# Patient Record
Sex: MALE | Race: WHITE | Employment: FULL TIME | URBAN - METROPOLITAN AREA
[De-identification: names, ages, dates, MRNs, and addresses within clinical notes are randomized per-mention and may not be internally consistent; named-entity substitution may affect disease eponyms.]

---

## 2017-02-10 ENCOUNTER — OFFICE VISIT (OUTPATIENT)
Dept: INTERNAL MEDICINE CLINIC | Facility: CLINIC | Age: 37
End: 2017-02-10

## 2017-02-10 VITALS
WEIGHT: 219 LBS | OXYGEN SATURATION: 98 % | SYSTOLIC BLOOD PRESSURE: 116 MMHG | HEART RATE: 58 BPM | TEMPERATURE: 98 F | DIASTOLIC BLOOD PRESSURE: 78 MMHG | HEIGHT: 70.5 IN | BODY MASS INDEX: 31 KG/M2

## 2017-02-10 DIAGNOSIS — S93.529A TURF TOE, INITIAL ENCOUNTER: Primary | ICD-10-CM

## 2017-02-10 DIAGNOSIS — Z00.00 ENCOUNTER FOR WELLNESS EXAMINATION: ICD-10-CM

## 2017-02-10 DIAGNOSIS — J34.89 NASAL OBSTRUCTION: ICD-10-CM

## 2017-02-10 DIAGNOSIS — B35.6 JOCK ITCH: ICD-10-CM

## 2017-02-10 DIAGNOSIS — R21 RASH OF NECK: ICD-10-CM

## 2017-02-10 DIAGNOSIS — K58.0 IRRITABLE BOWEL SYNDROME WITH DIARRHEA: ICD-10-CM

## 2017-02-10 DIAGNOSIS — G43.109 MIGRAINE WITH AURA AND WITHOUT STATUS MIGRAINOSUS, NOT INTRACTABLE: ICD-10-CM

## 2017-02-10 PROBLEM — K58.9 IRRITABLE BOWEL SYNDROME: Status: ACTIVE | Noted: 2017-02-10

## 2017-02-10 PROCEDURE — 99395 PREV VISIT EST AGE 18-39: CPT | Performed by: INTERNAL MEDICINE

## 2017-02-10 PROCEDURE — 99203 OFFICE O/P NEW LOW 30 MIN: CPT | Performed by: INTERNAL MEDICINE

## 2017-02-10 RX ORDER — SUMATRIPTAN 25 MG/1
25 TABLET, FILM COATED ORAL EVERY 2 HOUR PRN
Qty: 9 TABLET | Refills: 1 | Status: SHIPPED | OUTPATIENT
Start: 2017-02-10

## 2017-02-10 NOTE — PROGRESS NOTES
Royal Valentino is a 31year old male. HPI:   Mr Suzie Gagnon is here for annual wellness exam. He has several issues as listed below but generally he is doing well, works daily as a , good energy, good appetite.      Past Hx: skull fracture at heartburn  NEURO: denies headaches    EXAM:   /78 mmHg  Pulse 58  Temp(Src) 98 °F (36.7 °C) (Oral)  Ht 5' 10.5\" (1.791 m)  Wt 219 lb (99.338 kg)  BMI 30.97 kg/m2  SpO2 98%  GENERAL: well developed, well nourished,in no apparent distress  SKIN: no ra

## 2018-02-26 PROBLEM — J34.2 NASAL SEPTAL DEVIATION: Status: ACTIVE | Noted: 2018-02-26

## 2018-02-26 PROBLEM — J34.3 HYPERTROPHY OF BOTH INFERIOR NASAL TURBINATES: Status: ACTIVE | Noted: 2018-02-26

## 2023-02-16 ENCOUNTER — OFFICE VISIT (OUTPATIENT)
Dept: PULMONOLOGY | Facility: CLINIC | Age: 43
End: 2023-02-16
Payer: COMMERCIAL

## 2023-02-16 VITALS
RESPIRATION RATE: 20 BRPM | OXYGEN SATURATION: 99 % | BODY MASS INDEX: 34.45 KG/M2 | SYSTOLIC BLOOD PRESSURE: 126 MMHG | DIASTOLIC BLOOD PRESSURE: 75 MMHG | HEART RATE: 67 BPM | HEIGHT: 71 IN | WEIGHT: 246.06 LBS

## 2023-02-16 DIAGNOSIS — J30.0 VASOMOTOR RHINITIS: ICD-10-CM

## 2023-02-16 DIAGNOSIS — R05.3 CHRONIC COUGH: Primary | ICD-10-CM

## 2023-02-16 PROCEDURE — 99999 PR PBB SHADOW E&M-NEW PATIENT-LVL III: ICD-10-PCS | Mod: PBBFAC,,, | Performed by: INTERNAL MEDICINE

## 2023-02-16 PROCEDURE — 3008F BODY MASS INDEX DOCD: CPT | Mod: CPTII,S$GLB,, | Performed by: INTERNAL MEDICINE

## 2023-02-16 PROCEDURE — 3078F DIAST BP <80 MM HG: CPT | Mod: CPTII,S$GLB,, | Performed by: INTERNAL MEDICINE

## 2023-02-16 PROCEDURE — 3008F PR BODY MASS INDEX (BMI) DOCUMENTED: ICD-10-PCS | Mod: CPTII,S$GLB,, | Performed by: INTERNAL MEDICINE

## 2023-02-16 PROCEDURE — 99999 PR PBB SHADOW E&M-NEW PATIENT-LVL III: CPT | Mod: PBBFAC,,, | Performed by: INTERNAL MEDICINE

## 2023-02-16 PROCEDURE — 3078F PR MOST RECENT DIASTOLIC BLOOD PRESSURE < 80 MM HG: ICD-10-PCS | Mod: CPTII,S$GLB,, | Performed by: INTERNAL MEDICINE

## 2023-02-16 PROCEDURE — 99204 PR OFFICE/OUTPT VISIT, NEW, LEVL IV, 45-59 MIN: ICD-10-PCS | Mod: S$GLB,,, | Performed by: INTERNAL MEDICINE

## 2023-02-16 PROCEDURE — 3074F SYST BP LT 130 MM HG: CPT | Mod: CPTII,S$GLB,, | Performed by: INTERNAL MEDICINE

## 2023-02-16 PROCEDURE — 3074F PR MOST RECENT SYSTOLIC BLOOD PRESSURE < 130 MM HG: ICD-10-PCS | Mod: CPTII,S$GLB,, | Performed by: INTERNAL MEDICINE

## 2023-02-16 PROCEDURE — 99204 OFFICE O/P NEW MOD 45 MIN: CPT | Mod: S$GLB,,, | Performed by: INTERNAL MEDICINE

## 2023-02-16 PROCEDURE — 1160F PR REVIEW ALL MEDS BY PRESCRIBER/CLIN PHARMACIST DOCUMENTED: ICD-10-PCS | Mod: CPTII,S$GLB,, | Performed by: INTERNAL MEDICINE

## 2023-02-16 PROCEDURE — 1160F RVW MEDS BY RX/DR IN RCRD: CPT | Mod: CPTII,S$GLB,, | Performed by: INTERNAL MEDICINE

## 2023-02-16 PROCEDURE — 1159F PR MEDICATION LIST DOCUMENTED IN MEDICAL RECORD: ICD-10-PCS | Mod: CPTII,S$GLB,, | Performed by: INTERNAL MEDICINE

## 2023-02-16 PROCEDURE — 1159F MED LIST DOCD IN RCRD: CPT | Mod: CPTII,S$GLB,, | Performed by: INTERNAL MEDICINE

## 2023-02-16 RX ORDER — GABAPENTIN 300 MG/1
600 CAPSULE ORAL 3 TIMES DAILY
Qty: 180 CAPSULE | Refills: 11 | Status: SHIPPED | OUTPATIENT
Start: 2023-02-16

## 2023-02-16 RX ORDER — IPRATROPIUM BROMIDE 21 UG/1
2 SPRAY, METERED NASAL 2 TIMES DAILY
Qty: 30 ML | Refills: 6 | Status: SHIPPED | OUTPATIENT
Start: 2023-02-16

## 2023-02-16 NOTE — PROGRESS NOTES
Subjective:       Patient ID: Dandy Connell is a 42 y.o. male.    Chief Complaint: Cough    Cough      Patient is a 42-year-old male who was otherwise healthy who recently moved here from [place] in September of 2022.  Prior to that he was evaluated by multiple physicians and multiple specialties for a persistent/chronic cough that began the early part of last year with no particular cause found.  He had multiple studies including spirometry (normal), methacholine challenge (negative), upper endoscopy, pH probe, CT of the chest, nasopharyngoscopy, sinus imaging and visualization he even underwent a uvulectomy none of which has helped decidedly with his chronic dry cough.  He states that he has frequent paroxysms that can sometimes even cause near syncope.  He was ultimately diagnosed with globus pharyngeal us because of frequent sensation of fullness to the right neck associated with his cough in an unconscious need to turn his head to the right when he does cough.  I reviewed the above studies as well as many additional studies that the patient provided from his chart account on his mobile phone.  He was treated unsuccessfully with tricyclic antidepressant.  He was treated unsuccessfully empirically for reflux as well.  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  No significant industrial exposure.  Inciting factors which makes the cough worse include exercise and talking for extended periods of time.  He has a self-referral today for the above reasons    Past Medical History:   Diagnosis Date    Arthritis      History reviewed. No pertinent surgical history.  Social History     Tobacco Use    Smoking status: Never    Smokeless tobacco: Never   Substance Use Topics    Alcohol use: Yes     Alcohol/week: 3.0 standard drinks     Types: 3 Glasses of wine per week    Drug use: Not Currently     History reviewed. No pertinent family history.    Review of Systems   Respiratory:  Positive for cough.     as per HPI otherwise negative    Objective:      Physical Exam   Constitutional: He is oriented to person, place, and time. He appears well-developed and well-nourished. No distress.   HENT:   Head: Normocephalic.   Mouth/Throat: Mallampati Score: I.   Bilateral tympanic membranes and internal auditory canal are normal   Neck: No JVD present.   Cardiovascular: Normal rate and regular rhythm.   No murmur heard.  Pulmonary/Chest: Normal expansion, symmetric chest wall expansion, effort normal and breath sounds normal.   Abdominal: Soft. There is no hepatosplenomegaly.   Musculoskeletal:         General: No edema.      Cervical back: Neck supple.   Neurological: He is alert and oriented to person, place, and time.   Psychiatric: He has a normal mood and affect.   Nursing note and vitals reviewed.        Assessment:       1. Chronic cough    2. Vasomotor rhinitis          Outpatient Encounter Medications as of 2/16/2023   Medication Sig Dispense Refill    gabapentin (NEURONTIN) 300 MG capsule Take 2 capsules (600 mg total) by mouth 3 (three) times daily. 180 capsule 11    ipratropium (ATROVENT) 21 mcg (0.03 %) nasal spray 2 sprays by Each Nostril route 2 (two) times daily. 30 mL 6     No facility-administered encounter medications on file as of 2/16/2023.       Plan:       Chronic cough    Thus far Mr. Connell seems to have quite an atypical presentation of chronic cough.  The usual suspects have been successfully eliminated and/or have failed treatment.  There is at least some data 4 Neurontin in his suspected neural pathway trigger mediated cough.  Additionally the use of Atrovent nose spray may provide some benefit as well.  I will start him on Atrovent nose spray twice a day and Neurontin 300 mg 3 times a day with a plan to taper that up to 600 mg t.i.d. in 2 weeks if tolerated.  Additionally Dr. Srinivasan at [facility] may be able to provide some additional expertise and I will place a  referral to him for that as well.  I discussed the above with the patient in detail and he voiced understanding and agreement with this plan.  I will see him back in 4 weeks for close follow-up.

## 2023-03-30 ENCOUNTER — TELEPHONE (OUTPATIENT)
Dept: DERMATOLOGY | Facility: CLINIC | Age: 43
End: 2023-03-30
Payer: COMMERCIAL

## 2023-03-30 NOTE — TELEPHONE ENCOUNTER
Spoke to patient regarding his appt. Instructed him to contact his insurance [employer] [place] for find a provider. ----- Message from Paul Calhoun sent at 3/30/2023 10:01 AM CDT -----  Contact: Pt  Type:  Patient Returning Call    Who Called:pt   Who Left Message for Patient:Josephine   Does the patient know what this is regarding?: an appt   Would the patient rather a call back or a response via [facility]? phone  Best Call Back Number: [phone]   Additional Information: pt is at work and would like the nurse call him anytime after [phone]

## 2023-03-30 NOTE — TELEPHONE ENCOUNTER
Attempted to call pt in regards to scheduling an appointment. No answer. Message left.   ----- Message from Jazmin Astorga sent at 3/29/2023  7:32 PM CDT -----  Regarding: Scheduling  Good Evening    Can I get the above referenced patient scheduled. He has a mole on his back that appears to be getting darker and also has a rash on his knee and both elbows    Thank You

## 2023-08-07 ENCOUNTER — OFFICE VISIT (OUTPATIENT)
Dept: PRIMARY CARE CLINIC | Facility: CLINIC | Age: 43
End: 2023-08-07
Payer: COMMERCIAL

## 2023-08-07 DIAGNOSIS — B35.9 TINEA: Primary | ICD-10-CM

## 2023-08-07 PROCEDURE — 1160F PR REVIEW ALL MEDS BY PRESCRIBER/CLIN PHARMACIST DOCUMENTED: ICD-10-PCS | Mod: CPTII,95,, | Performed by: INTERNAL MEDICINE

## 2023-08-07 PROCEDURE — 99203 PR OFFICE/OUTPT VISIT, NEW, LEVL III, 30-44 MIN: ICD-10-PCS | Mod: 95,,, | Performed by: INTERNAL MEDICINE

## 2023-08-07 PROCEDURE — 1159F PR MEDICATION LIST DOCUMENTED IN MEDICAL RECORD: ICD-10-PCS | Mod: CPTII,95,, | Performed by: INTERNAL MEDICINE

## 2023-08-07 PROCEDURE — 1160F RVW MEDS BY RX/DR IN RCRD: CPT | Mod: CPTII,95,, | Performed by: INTERNAL MEDICINE

## 2023-08-07 PROCEDURE — 1159F MED LIST DOCD IN RCRD: CPT | Mod: CPTII,95,, | Performed by: INTERNAL MEDICINE

## 2023-08-07 PROCEDURE — 99203 OFFICE O/P NEW LOW 30 MIN: CPT | Mod: 95,,, | Performed by: INTERNAL MEDICINE

## 2023-08-07 RX ORDER — FLUCONAZOLE 200 MG/1
200 TABLET ORAL WEEKLY
Qty: 4 TABLET | Refills: 0 | Status: SHIPPED | OUTPATIENT
Start: 2023-08-07 | End: 2023-08-29

## 2023-08-07 RX ORDER — CLOTRIMAZOLE AND BETAMETHASONE DIPROPIONATE 10; .64 MG/G; MG/G
CREAM TOPICAL 2 TIMES DAILY
Qty: 80 G | Refills: 5 | Status: SHIPPED | OUTPATIENT
Start: 2023-08-07

## 2023-08-07 RX ORDER — HYDROXYZINE HYDROCHLORIDE 25 MG/1
25 TABLET, FILM COATED ORAL 3 TIMES DAILY PRN
Qty: 30 TABLET | Refills: 0 | Status: SHIPPED | OUTPATIENT
Start: 2023-08-07

## 2023-08-07 NOTE — PROGRESS NOTES
The patient location is: [place]  The chief complaint leading to consultation is: rash    Visit type: audiovisual    Face to Face time with patient:   20 minutes of total time spent on the encounter, which includes face to face time and non-face to face time preparing to see the patient (eg, review of tests), Obtaining and/or reviewing separately obtained history, Documenting clinical information in the electronic or other health record, Independently interpreting results (not separately reported) and communicating results to the patient/family/caregiver, or Care coordination (not separately reported).         Each patient to whom he or she provides medical services by telemedicine is:  (1) informed of the relationship between the physician and patient and the respective role of any other health care provider with respect to management of the patient; and (2) notified that he or she may decline to receive medical services by telemedicine and may withdraw from such care at any time.    Notes:     Subjective     Patient ID: Dandy Connell is a 43 y.o. male.    Chief Complaint: rash    Rash  This is a new problem. The current episode started 1 to 4 weeks ago. The problem has been rapidly worsening since onset. The affected locations include the neck, chest, groin, left buttock, right axilla, right arm, right hand and right buttock. The rash is characterized by dryness, pain, redness, itchiness and peeling. He was exposed to nothing. Pertinent negatives include no anorexia, congestion, cough, diarrhea, eye pain, facial edema, fatigue, fever, joint pain, nail changes, rhinorrhea, shortness of breath, sore throat or vomiting. Past treatments include analgesics, anti-itch cream, antibiotics and topical steroids. The treatment provided no relief. His past medical history is significant for eczema and varicella. There is no history of allergies or asthma.       Past Medical History:   Diagnosis Date    Arthritis      Review of  patient's allergies indicates:   Allergen Reactions    Mold      Unknown reaction per patient     History reviewed. No pertinent surgical history.  History reviewed. No pertinent family history.  Social History     Socioeconomic History    Marital status:    Tobacco Use    Smoking status: Never    Smokeless tobacco: Never   Substance and Sexual Activity    Alcohol use: Yes     Alcohol/week: 3.0 standard drinks of alcohol     Types: 3 Glasses of wine per week    Drug use: Not Currently    Sexual activity: Yes         There were no vitals taken for this visit.  Outpatient Medications as of 8/7/2023   Medication Sig Dispense Refill    gabapentin (NEURONTIN) 300 MG capsule Take 2 capsules (600 mg total) by mouth 3 (three) times daily. 180 capsule 11    ipratropium (ATROVENT) 21 mcg (0.03 %) nasal spray 2 sprays by Each Nostril route 2 (two) times daily. 30 mL 6     No current facility-administered medications on file as of 8/7/2023.       Review of Systems   Constitutional:  Negative for fatigue and fever.   HENT:  Negative for nasal congestion, rhinorrhea and sore throat.    Eyes:  Negative for pain.   Respiratory:  Negative for cough and shortness of breath.    Gastrointestinal:  Negative for anorexia, diarrhea and vomiting.   Musculoskeletal:  Negative for joint pain.   Integumentary:  Positive for rash. Negative for nail changes.   All other systems reviewed and are negative.         Objective     Physical Exam  A&O  Demarcated hyperpigmented rash right axilla and upper chest; reports it is in groin area as well     Assessment and Plan     1. Tinea  -     clotrimazole-betamethasone 1-0.05% (LOTRISONE) cream; Apply topically 2 (two) times daily.  Dispense: 80 g; Refill: 5  -     hydrOXYzine HCL (ATARAX) 25 MG tablet; Take 1 tablet (25 mg total) by mouth 3 (three) times daily as needed for Itching.  Dispense: 30 tablet; Refill: 0  -     fluconazole (DIFLUCAN) 200 MG Tab; Take 1 tablet (200 mg total) by mouth  once a week. for 4 doses  Dispense: 4 tablet; Refill: 0         F/u in person this week    Dove sensitive soap  All or tide sensitive

## 2024-07-31 ENCOUNTER — OFFICE VISIT (OUTPATIENT)
Dept: PAIN MEDICINE | Facility: CLINIC | Age: 44
End: 2024-07-31
Payer: COMMERCIAL

## 2024-07-31 ENCOUNTER — HOSPITAL ENCOUNTER (OUTPATIENT)
Dept: RADIOLOGY | Facility: HOSPITAL | Age: 44
Discharge: HOME | End: 2024-07-31
Attending: PHYSICAL MEDICINE & REHABILITATION
Payer: COMMERCIAL

## 2024-07-31 VITALS
DIASTOLIC BLOOD PRESSURE: 73 MMHG | HEART RATE: 69 BPM | SYSTOLIC BLOOD PRESSURE: 113 MMHG | HEIGHT: 71 IN | BODY MASS INDEX: 30.1 KG/M2 | WEIGHT: 215 LBS

## 2024-07-31 DIAGNOSIS — M54.16 ACUTE LUMBAR RADICULOPATHY: ICD-10-CM

## 2024-07-31 DIAGNOSIS — M54.16 ACUTE LUMBAR RADICULOPATHY: Primary | ICD-10-CM

## 2024-07-31 PROCEDURE — 3078F DIAST BP <80 MM HG: CPT | Mod: CPTII,S$GLB,, | Performed by: PHYSICAL MEDICINE & REHABILITATION

## 2024-07-31 PROCEDURE — 72114 X-RAY EXAM L-S SPINE BENDING: CPT | Mod: 26,,, | Performed by: RADIOLOGY

## 2024-07-31 PROCEDURE — 3008F BODY MASS INDEX DOCD: CPT | Mod: CPTII,S$GLB,, | Performed by: PHYSICAL MEDICINE & REHABILITATION

## 2024-07-31 PROCEDURE — 72114 X-RAY EXAM L-S SPINE BENDING: CPT | Mod: TC

## 2024-07-31 PROCEDURE — 99203 OFFICE O/P NEW LOW 30 MIN: CPT | Mod: S$GLB,,, | Performed by: PHYSICAL MEDICINE & REHABILITATION

## 2024-07-31 PROCEDURE — 99999 PR PBB SHADOW E&M-EST. PATIENT-LVL III: CPT | Mod: PBBFAC,,, | Performed by: PHYSICAL MEDICINE & REHABILITATION

## 2024-07-31 PROCEDURE — 3074F SYST BP LT 130 MM HG: CPT | Mod: CPTII,S$GLB,, | Performed by: PHYSICAL MEDICINE & REHABILITATION

## 2024-07-31 PROCEDURE — 1159F MED LIST DOCD IN RCRD: CPT | Mod: CPTII,S$GLB,, | Performed by: PHYSICAL MEDICINE & REHABILITATION

## 2024-07-31 RX ORDER — METHYLPREDNISOLONE 4 MG/1
TABLET ORAL
Qty: 1 EACH | Refills: 0 | Status: SHIPPED | OUTPATIENT
Start: 2024-07-31

## 2024-07-31 RX ORDER — DICLOFENAC SODIUM 75 MG/1
75 TABLET, DELAYED RELEASE ORAL 2 TIMES DAILY
Qty: 28 TABLET | Refills: 0 | Status: SHIPPED | OUTPATIENT
Start: 2024-07-31 | End: 2024-08-14

## 2024-07-31 NOTE — PROGRESS NOTES
New Patient Chronic Pain Note (Initial Visit)    Referring Physician: Karis Cook    PCP: Karen Roman MD    Chief Complaint:   Chief Complaint   Patient presents with    Low-back Pain     Shooting into left leg and up spine        SUBJECTIVE:    Dandy Connell is a 44 y.o. male who presents to the clinic for the evaluation of lower back and leg pain.  He is self referred.  The pain started about 5 days ago following a long road trip and repetitive heavy lifting and symptoms have been unchanged.  He reports he has not had pain like this before.  The pain is located in the lumbosacral area and radiates to the bilateral lower extremities, left worse than right.  The pain is described as  sharp, stabbing, aching  and is rated as 9/10. The pain is rated with a score of  9/10 on the BEST day and a score of 10/10 on the WORST day.  Symptoms interfere with daily activity. The pain is exacerbated by walking, sit to stand, prolonged sitting, squatting.  The pain is mitigated by heat.     Patient denies night fever/night sweats, urinary incontinence, bowel incontinence, significant weight loss, significant motor weakness, and loss of sensations.    Pain Disability Index Review:         7/31/2024     9:42 AM   Last 3 PDI Scores   Pain Disability Index (PDI) 70       Non-Pharmacologic Treatments:  Physical Therapy/Home Exercise: no  Ice/Heat:yes  TENS: no  Acupuncture: no  Massage: no  Chiropractic: no    Other: no      Pain Medications:  - Opioids:  None  - Adjuvant Medications:  Gabapentin  - Anti-Coagulants:  None     report:  Reviewed and consistent with medication use as prescribed.    Pain Procedures:   Denies      Imaging:   No pertinent imaging available to review    Past Medical History:   Diagnosis Date    Arthritis      History reviewed. No pertinent surgical history.  Social History     Socioeconomic History    Marital status:    Tobacco Use    Smoking status: Never    Smokeless tobacco:  Never   Substance and Sexual Activity    Alcohol use: Yes     Alcohol/week: 3.0 standard drinks of alcohol     Types: 3 Glasses of wine per week    Drug use: Not Currently    Sexual activity: Yes     No family history on file.    Review of patient's allergies indicates:   Allergen Reactions    Mold      Unknown reaction per patient       Current Outpatient Medications   Medication Sig    clotrimazole-betamethasone 1-0.05% (LOTRISONE) cream Apply topically 2 (two) times daily.    hydrOXYzine HCL (ATARAX) 25 MG tablet Take 1 tablet (25 mg total) by mouth 3 (three) times daily as needed for Itching.    diclofenac (VOLTAREN) 75 MG EC tablet Take 1 tablet (75 mg total) by mouth 2 (two) times daily. for 14 days    gabapentin (NEURONTIN) 300 MG capsule Take 2 capsules (600 mg total) by mouth 3 (three) times daily.    ipratropium (ATROVENT) 21 mcg (0.03 %) nasal spray 2 sprays by Each Nostril route 2 (two) times daily.    methylPREDNISolone (MEDROL DOSEPACK) 4 mg tablet use as directed     No current facility-administered medications for this visit.       Review of Systems     GENERAL:  No weight loss, malaise or fevers.  HEENT:   No recent changes in vision or hearing  NECK:  Negative for lumps, no difficulty with swallowing.  RESPIRATORY:  Negative for cough, wheezing or shortness of breath, patient denies any recent URI.  CARDIOVASCULAR:  Negative for chest pain, leg swelling or palpitations.  GI:  Negative for abdominal discomfort, blood in stools or black stools or change in bowel habits.  MUSCULOSKELETAL:  See HPI.  SKIN:  Negative for lesions, rash, and itching.  PSYCH:  No mood disorder or recent psychosocial stressors.  Patients sleep is not disturbed secondary to pain.  HEMATOLOGY/LYMPHOLOGY:  Negative for prolonged bleeding, bruising easily or swollen nodes.  Patient is not currently taking any anti-coagulants  NEURO:   No history of headaches, syncope, paralysis, seizures or tremors.  All other reviewed and  "negative other than HPI.    OBJECTIVE:    /73   Pulse 69   Ht 5' 11" (1.803 m)   Wt 97.5 kg (215 lb)   BMI 29.99 kg/m²         Physical Exam    GENERAL: Well appearing, in no acute distress, alert and oriented x3.  PSYCH:  Mood and affect appropriate.  SKIN: Skin color, texture, turgor normal, no rashes or lesions.  HEAD/FACE:  Normocephalic, atraumatic. Cranial nerves grossly intact.  CV: RRR with palpation of the radial artery.  PULM: No evidence of respiratory difficulty, symmetric chest rise.  GI:  Soft and non-tender.  BACK: Straight leg raising in the sitting and supine positions is positive to radicular pain bilaterally, left worse than right.  Mild pain to palpation over the facet joints of the lumbar spine and lumbar paraspinals.  Limited range of motion secondary to pain reproduction.   EXTREMITIES:  No deformities, edema, or skin discoloration. Good capillary refill.  MUSCULOSKELETAL:  Significant pain with hip flexion bilaterally.  There is no pain with palpation over the sacroiliac joints bilaterally.  FABERs test is negative.  FADIRs test is negative.   Bilateral upper and lower extremity strength is normal and symmetric.  No atrophy or tone abnormalities are noted.  NEURO: Bilateral upper and lower extremity coordination and muscle stretch reflexes are physiologic and symmetric.  Plantar response are downgoing. No clonus.  No loss of sensation is noted.  GAIT:  Slow, antalgic, difficulty with sit-to-stand.      LABS:  No results found for: "WBC", "HGB", "HCT", "MCV", "PLT"    CMP  No results found for: "NA", "K", "CL", "CO2", "GLU", "BUN", "CREATININE", "CALCIUM", "PROT", "ALBUMIN", "BILITOT", "ALKPHOS", "AST", "ALT", "ANIONGAP", "ESTGFRAFRICA", "EGFRNONAA"    No results found for: "LABA1C", "HGBA1C"          ASSESSMENT: 44 y.o. year old male with lower back pain, consistent with     1. Acute lumbar radiculopathy  X-Ray Lumbar Complete Including Flex And Ext            PLAN:   - " Interventions:  None at this time    - Anticoagulation use:  None      - Medications: I have stressed the importance of physical activity and a home exercise plan to help with pain and improve health. and Patient can continue with medications for now since they are providing benefits, using them appropriately, and without side effects.     Provide Medrol Dosepak for acute inflammatory back pain  Patient may utilize diclofenac 75 mg b.i.d. p.r.n. following completion of Medrol Dosepak if pain is still persistent          - Therapy:  Unable tolerate physical therapy at this time due to severity of pain, once pain has calmed down would likely recommend physical therapy    - Labs:  Reviewed    - Imaging: Reviewed available imaging with patient and answered any questions they had regarding study.Order Flexion-Extension X-rays of the Lumbar spine to rule out any instability.    - Consults/Referrals:  None at this time    - Records:  Reviewed/Obtain old records from outside physicians and imaging    - Follow up visit: return to clinic as needed    - Counseled patient regarding the importance of activity modification and physical therapy    - This condition does not require this patient to take time off of work, and the primary goal of our Pain Management services is to improve the patient's functional capacity.    - Patient Questions: Answered all of the patient's questions regarding diagnosis, therapy, and treatment        The above plan and management options were discussed at length with patient. Patient is in agreement with the above and verbalized understanding.    I discussed the goals of interventional chronic pain management with the patient on today's visit.  I explained the utility of injections for diagnostic and therapeutic purposes.  We discussed a multimodal approach to pain including treating the patient's given worst pain at any given time.  We will use a systematic approach to addressing pain.  We will  also adopt a multimodal approach that includes injections, adjuvant medications, physical therapy, at times psychiatry.  There may be a limited role for opioid use intermittently in the treatment of pain, more particularly for acute pain although no one approach can be used as a sole treatment modality.    I emphasized the importance of regular exercise, core strengthening and stretching, diet and weight loss as a cornerstone of long-term pain management.      Jonathan Decker MD  Interventional Pain Management  [facility]    Disclaimer:  This note was prepared using voice recognition system and is likely to have sound alike errors that may have been overlooked even after proof reading.  Please call me with any questions

## 2024-09-05 DIAGNOSIS — B35.9 TINEA: ICD-10-CM

## 2024-09-06 RX ORDER — CLOTRIMAZOLE AND BETAMETHASONE DIPROPIONATE 10; .64 MG/G; MG/G
CREAM TOPICAL 2 TIMES DAILY
Qty: 75 G | Refills: 0 | Status: SHIPPED | OUTPATIENT
Start: 2024-09-06

## 2025-06-02 ENCOUNTER — HOSPITAL ENCOUNTER (OUTPATIENT)
Dept: RADIOLOGY | Facility: HOSPITAL | Age: 45
Discharge: HOME | End: 2025-06-02
Attending: ORTHOPAEDIC SURGERY
Payer: COMMERCIAL

## 2025-06-02 ENCOUNTER — OFFICE VISIT (OUTPATIENT)
Dept: SPORTS MEDICINE | Facility: CLINIC | Age: 45
End: 2025-06-02
Payer: COMMERCIAL

## 2025-06-02 VITALS — WEIGHT: 214.94 LBS | BODY MASS INDEX: 30.09 KG/M2 | HEIGHT: 71 IN

## 2025-06-02 DIAGNOSIS — S83.242A TEAR OF MEDIAL MENISCUS OF LEFT KNEE, CURRENT, UNSPECIFIED TEAR TYPE, INITIAL ENCOUNTER: Primary | ICD-10-CM

## 2025-06-02 DIAGNOSIS — M25.462 KNEE EFFUSION, LEFT: ICD-10-CM

## 2025-06-02 DIAGNOSIS — M25.562 LEFT KNEE PAIN, UNSPECIFIED CHRONICITY: ICD-10-CM

## 2025-06-02 DIAGNOSIS — M79.675 GREAT TOE PAIN, LEFT: Primary | ICD-10-CM

## 2025-06-02 DIAGNOSIS — M25.562 ACUTE PAIN OF LEFT KNEE: ICD-10-CM

## 2025-06-02 PROCEDURE — 73564 X-RAY EXAM KNEE 4 OR MORE: CPT | Mod: TC,PN,LT

## 2025-06-02 PROCEDURE — 1159F MED LIST DOCD IN RCRD: CPT | Mod: CPTII,S$GLB,, | Performed by: ORTHOPAEDIC SURGERY

## 2025-06-02 PROCEDURE — 73562 X-RAY EXAM OF KNEE 3: CPT | Mod: 26,59,RT, | Performed by: RADIOLOGY

## 2025-06-02 PROCEDURE — 3008F BODY MASS INDEX DOCD: CPT | Mod: CPTII,S$GLB,, | Performed by: ORTHOPAEDIC SURGERY

## 2025-06-02 PROCEDURE — 99999 PR PBB SHADOW E&M-EST. PATIENT-LVL III: CPT | Mod: PBBFAC,,, | Performed by: ORTHOPAEDIC SURGERY

## 2025-06-02 PROCEDURE — 73564 X-RAY EXAM KNEE 4 OR MORE: CPT | Mod: 26,LT,, | Performed by: RADIOLOGY

## 2025-06-02 PROCEDURE — 99204 OFFICE O/P NEW MOD 45 MIN: CPT | Mod: S$GLB,,, | Performed by: ORTHOPAEDIC SURGERY

## 2025-06-02 RX ORDER — NAPROXEN 500 MG/1
500 TABLET ORAL 2 TIMES DAILY
Qty: 60 TABLET | Refills: 2 | Status: SHIPPED | OUTPATIENT
Start: 2025-06-02

## 2025-06-03 ENCOUNTER — HOSPITAL ENCOUNTER (OUTPATIENT)
Dept: RADIOLOGY | Facility: HOSPITAL | Age: 45
Discharge: HOME | End: 2025-06-03
Attending: ORTHOPAEDIC SURGERY
Payer: COMMERCIAL

## 2025-06-03 ENCOUNTER — OFFICE VISIT (OUTPATIENT)
Dept: ORTHOPEDICS | Facility: CLINIC | Age: 45
End: 2025-06-03
Payer: COMMERCIAL

## 2025-06-03 VITALS — BODY MASS INDEX: 29.96 KG/M2 | HEIGHT: 71 IN | WEIGHT: 214 LBS

## 2025-06-03 DIAGNOSIS — M19.072 ARTHRITIS OF FIRST METATARSOPHALANGEAL (MTP) JOINT OF LEFT FOOT: Primary | ICD-10-CM

## 2025-06-03 DIAGNOSIS — M79.675 GREAT TOE PAIN, LEFT: ICD-10-CM

## 2025-06-03 PROCEDURE — 99999 PR PBB SHADOW E&M-EST. PATIENT-LVL III: CPT | Mod: PBBFAC,,, | Performed by: ORTHOPAEDIC SURGERY

## 2025-06-03 PROCEDURE — 73630 X-RAY EXAM OF FOOT: CPT | Mod: 26,LT,, | Performed by: RADIOLOGY

## 2025-06-03 PROCEDURE — 73630 X-RAY EXAM OF FOOT: CPT | Mod: TC,LT

## 2025-06-17 ENCOUNTER — HOSPITAL ENCOUNTER (OUTPATIENT)
Dept: RADIOLOGY | Facility: HOSPITAL | Age: 45
Discharge: HOME | End: 2025-06-17
Attending: ORTHOPAEDIC SURGERY
Payer: COMMERCIAL

## 2025-06-17 DIAGNOSIS — M25.562 ACUTE PAIN OF LEFT KNEE: ICD-10-CM

## 2025-06-17 PROCEDURE — 73721 MRI JNT OF LWR EXTRE W/O DYE: CPT | Mod: TC,LT

## 2025-06-17 PROCEDURE — 73721 MRI JNT OF LWR EXTRE W/O DYE: CPT | Mod: 26,LT,, | Performed by: RADIOLOGY

## 2025-06-19 ENCOUNTER — OFFICE VISIT (OUTPATIENT)
Dept: SPORTS MEDICINE | Facility: CLINIC | Age: 45
End: 2025-06-19
Payer: COMMERCIAL

## 2025-06-19 VITALS — BODY MASS INDEX: 30.8 KG/M2 | WEIGHT: 220 LBS | HEIGHT: 71 IN

## 2025-06-19 DIAGNOSIS — M66.0 RUPTURED CYST OF LEFT POPLITEAL SPACE: Primary | ICD-10-CM

## 2025-06-19 DIAGNOSIS — M94.20 CHONDROMALACIA: ICD-10-CM

## 2025-06-19 DIAGNOSIS — E66.811 CLASS 1 OBESITY WITH SERIOUS COMORBIDITY AND BODY MASS INDEX (BMI) OF 30.0 TO 30.9 IN ADULT, UNSPECIFIED OBESITY TYPE: ICD-10-CM

## 2025-06-19 PROCEDURE — 3008F BODY MASS INDEX DOCD: CPT | Mod: CPTII,S$GLB,, | Performed by: ORTHOPAEDIC SURGERY

## 2025-06-19 PROCEDURE — 99214 OFFICE O/P EST MOD 30 MIN: CPT | Mod: S$GLB,,, | Performed by: ORTHOPAEDIC SURGERY

## 2025-06-19 PROCEDURE — 99999 PR PBB SHADOW E&M-EST. PATIENT-LVL III: CPT | Mod: PBBFAC,,, | Performed by: ORTHOPAEDIC SURGERY

## 2025-06-19 NOTE — PATIENT INSTRUCTIONS
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 a ruptured popliteal cyst.       Encounter Diagnoses   Name Primary?    Ruptured cyst of left popliteal space Yes    Chondromalacia     Class 1 obesity with serious comorbidity and body mass index (BMI) of 30.0 to 30.9 in adult, unspecified obesity type           Plan:  Order PT at [place] with [facility] - 2-3x per week for 6-8 weeks  Follow up in 4 weeks to assess progress  May consider CSI in the future. Deferred today due level of pain.     Follow-up: 4 weeks. Please reach out to us sooner if there are any problems between now and then.    About Dr. Sophie Barrios's Research & Publications    Give us Feedback:   Google: Leave Google Review  Healthgrades: Leave Healthgrades Review

## 2025-06-20 NOTE — PROGRESS NOTES
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    HPI:  Dandy presents for evaluation of knee pain. He reports pain in the front of his knee, with occasional catching or popping sensations. He also mentions swelling and pain across the back of the knee. He rates his current pain as 3/10 and describes knee instability, expressing a lack of confidence in twisting or moving laterally. He wears a knee sleeve during physical activity and has refrained from running since the onset of symptoms. An MRI two weeks after the initial injury showed significant fluid in the knee. He denies any recent steroid injections. He mentions attending [employer] where he was instructed to run around, which may have contributed to his current condition.    He denies any current chest pain, shortness of breath, or other systemic symptoms. He denies any history of chronic knee conditions or previous knee surgeries. Knee sleeve: Worn during physical activities    SURGICAL HISTORY:  - Knee arthroscopy: Recently, cleaning up cartilage damage in multiple areas of the knee (medial femoral condyle, trochlea, and patella), trimming unstable cartilage flaps, and performing synovectomy to address inflammation    WORK STATUS:  - Works at gymnastics camps during summer  - Involves running and being active  - Currently staying out of certain activities due to knee  injury  - Only doing upper body workouts recently      ROS:  Musculoskeletal: +joint swelling, +limb pain, +limb swelling, +muscle weakness, +pain with movement       Recall from previous HPI on 6/2/25: Dandy presents with a right knee injury that occurred approximately 6-7 days ago. He reports pain underneath the kneecap on the right side, along the joint line, and towards the kneecap. The swelling has decreased since the initial injury, but his knee still feels weak and he lacks confidence in twisting movements on the affected knee.    Past Medical History:   Past Medical History:   Diagnosis Date    Arthritis      No past surgical history on file.  No family history on file.  Social History[1]  Medication List with Changes/Refills   Current Medications    CLOTRIMAZOLE-BETAMETHASONE 1-0.05% (LOTRISONE) CREAM    APPLY TOPICALLY TO THE AFFECTED AREA TWICE DAILY    HYDROXYZINE HCL (ATARAX) 25 MG TABLET    Take 1 tablet (25 mg total) by mouth 3 (three) times daily as needed for Itching.    METHYLPREDNISOLONE (MEDROL DOSEPACK) 4 MG TABLET    use as directed    NAPROXEN (NAPROSYN) 500 MG TABLET    Take 1 tablet (500 mg total) by mouth 2 (two) times daily.     Review of patient's allergies indicates:   Allergen Reactions    Aller ext-american cockroach Other (See Comments)     unknown    Allerg ext-tree poll-red maple Itching     Via allergy testing    Mold      Unknown reaction per patient    Cedarwood Itching     ROS    Physical Exam:   Body mass index is 30.68 kg/m².  There were no vitals filed for this visit.   GENERAL: Well appearing, appropriate for stated age, no acute distress.  CARDIOVASCULAR: Pulses regular by peripheral palpation.  PULMONARY: Respirations are even and non-labored.  NEURO: Awake, alert, and oriented x 3.  PSYCH: Mood & affect are appropriate.  HEENT: Head is normocephalic and atraumatic.  Ortho/SPM Exam  Left Knee Exam      Inspection   Effusion: present     Tenderness   The patient tender to  palpation of the medial joint line.     Range of Motion   Extension:  0   Flexion:  120      Tests   Meniscus   Matthew:  Medial - positive Lateral - negative  Stability   Lachman: normal (-1 to 2mm)   PCL-Posterior Drawer: normal (0 to 2mm)  MCL - Valgus: normal (0 to 2mm)  LCL - Varus: normal (0 to 2mm)     Other   Sensation: normal     Comments:  Intact EHL, FHL, gastrocsoleus, and tibialis anterior. Sensation intact to light touch in superficial peroneal, deep peroneal, tibial, sural, and saphenous nerve distributions. Foot warm and well perfused with capillary refill of less than 2 seconds and palpable pedal pulses.        Muscle Strength   Left Lower Extremity   Hip Abduction: 5/5   Quadriceps:  5/5   Hamstrin/5      Vascular Exam         Left Pulses  Dorsalis Pedis:      2+  Posterior Tibial:      2+           Physical Exam    IMAGING:  - MRI of the knee: 2 weeks post-injury, Baker's cyst, plica in front of knee, frayed cartilage damage in kneecap groove, significant fluid in knee  - Arthroscopy findings: Synovitis, 14x10 mm cartilage defect on inside of knee, cartilage injury behind kneecap on trochlea, cartilage lesion underneath kneecap, no meniscus tear, ACL intact, some softening on lateral side without cartilage defect           Imaging:    MRI Knee Without Contrast Left  Narrative: EXAMINATION:  MRI KNEE WITHOUT CONTRAST LEFT    CLINICAL HISTORY:  Meniscal tear, untreated, new symptoms;Pain in left knee    TECHNIQUE:  Multiplanar, multisequence images were preformed about the knee.    COMPARISON:  Plain films from 2025    FINDINGS:  Menisci:  No signal abnormality to suggest meniscal tear.    Cruciate ligaments: No tear is seen. There is no evidence of mucoid degeneration.    Collateral ligaments: The medial and lateral collateral ligaments are intact.    Extensor mechanism: No tear is seen.    Cartilage: There is a small focal full-thickness area of cartilage within the trochlear groove  just lateral to midline and best seen on the sagittal imaging plane series 7, image 13 that measures may be 3 x 3 mm.  There is also some heterogeneity/increased signal seen within the trochlear groove cartilage.    Marrow: No signal abnormality is present to suggest a marrow replacement process.No fracture.    Joint fluid and synovium: No significant joint effusion.  Note made of a ruptured popliteal cyst.  Impression: 1. No evidence to suggest a meniscal tear.  2. Chondromalacia involving the trochlear groove.  3. Findings consistent with a ruptured popliteal cyst.    Electronically signed by: Cesar Meredith DO  Date:    06/17/2025  Time:    08:58      Relevant imaging results reviewed and interpreted by me, discussed with the patient and / or family today.     Other Tests:     Assessment & Plan    PLAN SUMMARY:   Steroid injection discussed but deferred for now   MRI reviewed: Baker's cyst, plica, and frayed cartilage damage in kneecap groove identified   Avoid submerging surgical site in bath or pool for one more week   Use knee sleeve during activities, not continuously   Avoid running; physical therapist to guide return to running   Referred to PT Chirag) for knee rehabilitation and strengthening   Follow up in 4 weeks (July 28th at 9:15 AM) or sooner if knee condition worsens    CHONDROMALACIA PATELLAE AND CARTILAGE DISORDERS:   Reviewed MRI images with patient, showing frayed cartilage damage in the kneecap groove.   Discussed potential future Tierney procedure for cartilage repair, but no immediate plans.    BAKER'S CYST:   Reviewed MRI images with patient, showing fluid posterior knee (Baker's cyst).    PLICA SYNDROME:   Reviewed MRI images with patient, showing plica anterior knee.    KNEE JOINT DISORDERS:   Discussed steroid injection as potential treatment option, but decided to hold off for now.   Avoid running; allow physical therapist to guide return to running.   Use knee sleeve during activities, but  not 24/7.   Referred to PT with [facility] for knee rehabilitation and strengthening.    POST-OPERATIVE CARE:   Wait another week before submerging surgical site in bath or pool.    FOLLOW-UP:   Follow up in 4 weeks (July 28th at 9:15 AM) or sooner if knee condition worsens.         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 a ruptured popliteal cyst.       Encounter Diagnoses   Name Primary?    Ruptured cyst of left popliteal space Yes    Chondromalacia           Plan:  Order PT at [place] with [facility] - 2-3x per week for 6-8 weeks  Follow up in 4 weeks to assess progress  May consider CSI in the future. Deferred today due level of pain.     Follow-up: 4 weeks. Please reach out to us sooner if there are any problems between now and then.    About Dr. Sophie Barrios's Research & Publications    Give us Feedback:   Google: Leave Google Review  Healthgrades: Leave Healthgrades Review       Provider Note/Medical Decision Making:     I discussed worrisome and red flag signs and symptoms with the patient. The patient expressed understanding and agreed to alert me immediately or to go to the emergency room if they experience any of these. Treatment plan was developed with input from the patient/family, and they expressed understanding and agreement with the plan. All questions were answered today.          Salvatore Barrios MD  Board Certified in Orthopaedic Surgery & Sports Medicine   Regional Section Head of Orthopedic Surgery & Sports Medicine  [facility]      Disclaimer: This note was prepared using a voice recognition system and is likely to have sound alike errors within the text.     This note was generated with the  assistance of ambient listening technology. Verbal consent was obtained by the patient and accompanying visitor(s) for the recording of patient appointment to facilitate this note. I attest to having reviewed and edited the generated note for accuracy, though some syntax or spelling errors may persist. Please contact the author of this note for any clarification.           [1]   Social History  Socioeconomic History    Marital status:    Tobacco Use    Smoking status: Never    Smokeless tobacco: Never   Substance and Sexual Activity    Alcohol use: Yes     Alcohol/week: 3.0 standard drinks of alcohol     Types: 3 Glasses of wine per week    Drug use: Not Currently    Sexual activity: Yes

## 2025-06-25 ENCOUNTER — CLINICAL SUPPORT (OUTPATIENT)
Facility: HOSPITAL | Age: 45
End: 2025-06-25
Attending: ORTHOPAEDIC SURGERY
Payer: COMMERCIAL

## 2025-06-25 DIAGNOSIS — R29.898 WEAKNESS OF LEFT HIP: ICD-10-CM

## 2025-06-25 DIAGNOSIS — M62.81 WEAKNESS OF LEFT QUADRICEPS MUSCLE: Primary | ICD-10-CM

## 2025-06-25 DIAGNOSIS — M25.562 LEFT ANTERIOR KNEE PAIN: ICD-10-CM

## 2025-06-25 DIAGNOSIS — M25.662 DECREASED RANGE OF MOTION (ROM) OF LEFT KNEE: ICD-10-CM

## 2025-06-25 DIAGNOSIS — M66.0 RUPTURED CYST OF LEFT POPLITEAL SPACE: ICD-10-CM

## 2025-06-25 PROCEDURE — 97161 PT EVAL LOW COMPLEX 20 MIN: CPT | Mod: PN

## 2025-06-25 NOTE — PROGRESS NOTES
Outpatient Rehab    Physical Therapy Evaluation (only)    Patient Name: Yoel Connell  MRN: [id number]  YOB: 1980  Encounter Date: 6/25/2025    Therapy Diagnosis:   Encounter Diagnosis   Name Primary?    Ruptured cyst of left popliteal space      Physician: Salvatore Barrios MD    Physician Orders: Eval and Treat  Medical Diagnosis: Ruptured cyst of left popliteal space  Surgical Diagnosis: Not applicable for this Episode  Surgical Date: Not applicable for this Episode  Days Since Last Surgery: Not applicable for this Episode    Visit # / Visits Authorized:  1 / 1  Insurance Authorization Period: 6/25/2025 to 12/31/2025  Date of Evaluation: 6/25/2025  Plan of Care Certification: 6/25/2025 to 9/25/2025     Time In:   5:00 PM  Time Out:  6:15  Total Time (in minutes):   75  Total Billable Time (in minutes):      Intake Outcome Measure for FOTO Survey    Therapist reviewed FOTO scores for Yoel Connell on 6/25/2025.   FOTO report - see Media section or FOTO account episode details.     Intake Score: 53%    Precautions:       Subjective       Date of onset: 3 - 4 weeks ago     History of current condition - Yoel reports L knee popped when running, history of catching and popping. Fluid found in the L knee. Patient reports instability. Patient states they have infrapatellar pain during a deep squatting. Patien also reports pain in the posterior, and medial knee.     Falls: no    Imaging: [] Xray [x] MRI [] CT: Performed on: last week     Pain:  Current 3/10, worst 9/10, best 3/10   Location: [] Right   [x] Left:    Description: sharp  Aggravating Factors: running,   Easing Factors: activity avoidance, rest,     Prior Therapy:   [] N/A    [x] Yes:   Social History: Pt lives with their family  Occupation: Pt is teach high school  Prior Level of Function: Independent and pain free with all ADL, IADL, community mobility and functional activities.   Current Level of Function: Independent with all ADL, IADL,  community mobility and functional activities with reports of increased pain and need for increased time and frequent breaks.      Dominant Extremity:    [x] Right    [] Left    Pts goals: Pt reported goals are to decrease overall pain levels in order to return to prior functional level. Wants to increase functionality to return to normalcy and all ADLs.    Past Medical History/Physical Systems Review:   Dandy Connell  has a past medical history of Arthritis.    Dandy Connell  has no past surgical history on file.    Dandy has a current medication list which includes the following prescription(s): clotrimazole-betamethasone 1-0.05%, hydroxyzine hcl, methylprednisolone, and naproxen.    Review of patient's allergies indicates:   Allergen Reactions    Aller ext-american cockroach Other (See Comments)     unknown    Allerg ext-tree poll-red maple Itching     Via allergy testing    Mold      Unknown reaction per patient    Cedarwood Itching        Objective    RANGE OF MOTION:   Knee AROM/PROM Right Left Pain/Dysfunction with Movement Goal   Knee Flexion (135º) 130 130 Pain with L Pain free on the L   Knee Extension (0º) 0 0 NA           STRENGTH:   L/E MMT Right  (spine) Left Pain/Dysfunction with Movement Goal   Hip Flexion  5/5 5/5  4+/5 B   Hip Extension  5/5 5/5  4+/5 B   Hip Abduction  5/5 5/5  4+/5 B   Knee Extension 5/5 4+/5 Pain 5/5 B   Knee Flexion 5/5 4+/5 Pain 5/5 B   Hip IR 5/5 5/5  4+/5 B   Hip ER 5/5 5/5  4+/5 B       SPECIAL TESTS:    Right  (spine) Left  Goal   Lachman Test [] Positive     [x] Negative [] Positive     [x] Negative Negative B    Anterior Drawer  [] Positive     [x] Negative [] Positive     [x] Negative Negative B    Posterior Drawer [] Positive     [x] Negative [] Positive     [x] Negative Negative B    Pivot-Shift Test [] Positive     [x] Negative [] Positive     [x] Negative Negative B    Varus Stress Test [] Positive     [x] Negative [] Positive     [x] Negative Negative B     Valgus Stress Test [] Positive     [x] Negative [x] Positive     [] Negative Negative B    Matthew Test [x] Positive     [] Negative [] Positive     [x] Negative Negative B    Thessaly  [] Positive     [x] Negative [] Positive     [x] Negative Negative B    Brown Compression  [] Positive     [x] Negative [] Positive     [x] Negative Negative B      Patient had medial joint line pain during Matthew's and valgus stress test at 0* of extension.       SENSATION  [x] Intact to Light Touch   [] Impaired:      PALPATION: Structures: Increased tenderness to palpation of: left , LOWER STRUCTURES : medial tibiofemoral joint line, fat pad of knee, patellar tendon      POSTURE:  Pt presents with postural abnormalities which include:    [] Forward Head   [] Increased Lumbar Lordosis   [] Rounded Shoulder   [] Genu Recurvatum   [] Increased Thoracic Kyphosis [x] Genu Valgus   [] Trunk Deviated    [] Pes Planus   [] Scapular Winging    [] Other:         FUNCTIONAL TESTING    Function:  - Squat: L foot protation, L tibial IR, valgus contralateral hip drop    - Single Leg Squat: B/L foot protation, B/L tibial IR, B/L valgus, B/L contralateral hip drop    - Single Leg Step Down Test: B/L foot protation, B/L tibial IR, B/L valgus, B/L contralateral hip drop        CMS Impairment/Limitation/Restriction for FOTO knee Survey    Therapist reviewed FOTO scores for Gracie on 6/12/2025.   FOTO documents entered into [employer] - see Media section.    Limitation Score: 53%              Time Entry(in minutes):       Assessment & Plan   Assessment  Yoel presents with a condition of Low complexity.   Presentation of Symptoms: Stable  Will Comorbidities Impact Care: No       Functional Limitations: Pain with ADLs/IADLs, Squatting  Impairments: Pain with functional activity    Patient Goal for Therapy (PT): Reduce pain to return to functionality with no pain  Prognosis: Excellent  Prognosis Details: Patient is a healthy individual who is very active  and has a lack of comorbidites, as well as a strong support system.  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 Patient shows pain in the medial / anterior L knee, seen especially in deep flexion ranges, as well as valgus positions of the same knee. Patient displays B/L foot protation, B/L tibial IR, B/L valgus, B/L contralateral hip drop during functional squatting, SL squatting, and SL step downs.       The patient's spiritual, cultural, and educational needs were considered, and the patient is agreeable to the plan of care and goals.           Goals:   Short Term Goals:  6 weeks Status  Date Met   PAIN: Pt will report worst pain of 3/10 in order to progress toward max functional ability and improve quality of life. [x] Progressing  [] Met  [] Not Met    FUNCTION: Patient will demonstrate improved function as indicated by a functional limitation score of less than or equal to 60 out of 100 on FOTO. [x] Progressing  [] Met  [] Not Met    MOBILITY: Patient will improve AROM to 50% of stated goals, listed in objective measures above, in order to progress towards independence with functional activities.  [x] Progressing  [] Met  [] Not Met    STRENGTH: Patient will improve strength to 50% of stated goals, listed in objective measures above, in order to progress towards independence with functional activities. [x] Progressing  [] Met  [] Not Met    POSTURE: Patient will correct postural deviations in sitting and standing, to decrease pain and promote long term stability.  [x] Progressing  [] Met  [] Not Met    GAIT: Patient will demonstrate improved gait mechanics including in order to improve functional mobility, improve quality of life, and decrease risk of further injury or fall.  [x] Progressing  [] Met  [] Not Met     HEP: Patient will demonstrate independence with HEP in order to progress toward functional independence. [x] Progressing  [] Met  [] Not Met      Long Term Goals:  12 weeks Status Date Met   PAIN: Pt will report worst pain of 2/10 in order to progress toward max functional ability and improve quality of life [x] Progressing  [] Met  [] Not Met    FUNCTION: Patient will demonstrate improved function as indicated by a functional limitation score of less than or equal to 73 out of 100 on FOTO. [x] Progressing  [] Met  [] Not Met    MOBILITY: Patient will improve AROM to stated goals, listed in objective measures above, in order to return to maximal functional potential and improve quality of life.  [x] Progressing  [] Met  [] Not Met    STRENGTH: Patient will improve strength to stated goals, listed in objective measures above, in order to improve functional independence and quality of life.  [x] Progressing  [] Met  [] Not Met    GAIT: Patient will demonstrate normalized gait mechanics with minimal compensation in order to return to PLOF. [x] Progressing  [] Met  [] Not Met    Patient will return to normal ADL's, IADL's, community involvement, recreational activities, and work-related activities with less than or equal to 1/10 pain and maximal function.  [x] Progressing  [] Met  [] Not Met       Plan of care Certification: 6/25/2025 to 9/25/2025.    Outpatient Physical Therapy 2 times weekly for 12 weeks to include any combination of the following interventions: virtual visits, dry needling, modalities, electrical stimulation (IFC, Pre-Mod, Attended with Functional Dry Needling), Cervical/Lumbar Traction, Gait Training, Manual Therapy, Neuromuscular Re-ed, Patient Education, Self Care, Therapeutic Exercise, and Therapeutic Activites     Bijan Nava, PT, DPT   Bijan Nava, PT

## 2025-06-27 PROBLEM — R29.898 WEAKNESS OF LEFT HIP: Status: ACTIVE | Noted: 2025-06-27

## 2025-06-27 PROBLEM — M62.81 WEAKNESS OF LEFT QUADRICEPS MUSCLE: Status: ACTIVE | Noted: 2025-06-27

## 2025-06-27 PROBLEM — M25.562 LEFT ANTERIOR KNEE PAIN: Status: ACTIVE | Noted: 2025-06-27

## 2025-06-27 PROBLEM — M25.662 DECREASED RANGE OF MOTION (ROM) OF LEFT KNEE: Status: ACTIVE | Noted: 2025-06-27

## 2025-07-28 ENCOUNTER — OFFICE VISIT (OUTPATIENT)
Dept: SPORTS MEDICINE | Facility: CLINIC | Age: 45
End: 2025-07-28
Payer: COMMERCIAL

## 2025-07-28 VITALS — HEIGHT: 71 IN | BODY MASS INDEX: 30.8 KG/M2 | WEIGHT: 220 LBS

## 2025-07-28 DIAGNOSIS — M94.20 CHONDROMALACIA: ICD-10-CM

## 2025-07-28 DIAGNOSIS — E66.811 CLASS 1 OBESITY WITH SERIOUS COMORBIDITY AND BODY MASS INDEX (BMI) OF 30.0 TO 30.9 IN ADULT, UNSPECIFIED OBESITY TYPE: ICD-10-CM

## 2025-07-28 DIAGNOSIS — M66.0 RUPTURED CYST OF LEFT POPLITEAL SPACE: Primary | ICD-10-CM

## 2025-07-28 PROCEDURE — 99999 PR PBB SHADOW E&M-EST. PATIENT-LVL III: CPT | Mod: PBBFAC,,, | Performed by: ORTHOPAEDIC SURGERY

## 2025-07-28 RX ADMIN — METHYLPREDNISOLONE ACETATE 80 MG: 80 INJECTION, SUSPENSION INTRA-ARTICULAR; INTRALESIONAL; INTRAMUSCULAR; SOFT TISSUE at 09:07

## 2025-07-28 NOTE — PROGRESS NOTES
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    HPI:  Dandy presents for follow-up regarding knee pain. He reports improvement with exercises, estimating knee function at approximately 80%. Pain has improved, but occasional popping or catching in the knee occurs, described as similar to a knuckle cracking sensation. This popping happens randomly and is not associated with pain. He experiences swelling in the back of the knee and occasional catching and popping in the front.    He completed one PT session before being out of town for three weeks. He has been doing exercises at home to strengthen the muscles around the knee. PT: One session, followed by home exercises to strengthen the muscles around the knee, resulting in some improvement.      ROS:  Musculoskeletal: -joint pain, +joint swelling       Recall HPI from 06/19/2025:  Dandy presents for evaluation of knee pain. He reports pain in the front of his knee, with occasional catching or popping sensations. He also mentions swelling and pain across the back of the knee. He rates his current pain as 3/10 and describes knee instability, expressing a lack of confidence in twisting or moving laterally. He wears a knee sleeve during physical activity and has refrained from running since the onset of symptoms. An MRI two weeks after the initial injury showed significant fluid in  the knee. He denies any recent steroid injections. He mentions attending gymnastics camp where he was instructed to run around, which may have contributed to his current condition.     He denies any current chest pain, shortness of breath, or other systemic symptoms. He denies any history of chronic knee conditions or previous knee surgeries. Knee sleeve: Worn during physical activities     SURGICAL HISTORY:  - Knee arthroscopy: Recently, cleaning up cartilage damage in multiple areas of the knee (medial femoral condyle, trochlea, and patella), trimming unstable cartilage flaps, and performing synovectomy to address inflammation     WORK STATUS:  - Works at [employer] during summer  - Involves running and being active  - Currently staying out of certain activities due to knee injury  - Only doing upper body workouts recently        ROS:  Musculoskeletal: +joint swelling, +limb pain, +limb swelling, +muscle weakness, +pain with movement       Recall from previous HPI on 6/2/25: Dandy presents with a right knee injury that occurred approximately 6-7 days ago. He reports pain underneath the kneecap on the right side, along the joint line, and towards the kneecap. The swelling has decreased since the initial injury, but his knee still feels weak and he lacks confidence in twisting movements on the affected knee.     Past Medical History:   Past Medical History:   Diagnosis Date    Arthritis      No past surgical history on file.  No family history on file.  Social History[1]  Medication List with Changes/Refills   Current Medications    CLOTRIMAZOLE-BETAMETHASONE 1-0.05% (LOTRISONE) CREAM    APPLY TOPICALLY TO THE AFFECTED AREA TWICE DAILY    HYDROXYZINE HCL (ATARAX) 25 MG TABLET    Take 1 tablet (25 mg total) by mouth 3 (three) times daily as needed for Itching.    METHYLPREDNISOLONE (MEDROL DOSEPACK) 4 MG TABLET    use as directed    NAPROXEN (NAPROSYN) 500 MG TABLET    Take 1 tablet (500 mg total) by mouth 2  (two) times daily.     Review of patient's allergies indicates:   Allergen Reactions    Aller ext-american cockroach Other (See Comments)     unknown    Allerg ext-tree poll-red maple Itching     Via allergy testing    Mold      Unknown reaction per patient    Cedarwood Itching     ROS    Physical Exam:   Body mass index is 30.69 kg/m².  There were no vitals filed for this visit.   GENERAL: Well appearing, appropriate for stated age, no acute distress.  CARDIOVASCULAR: Pulses regular by peripheral palpation.  PULMONARY: Respirations are even and non-labored.  NEURO: Awake, alert, and oriented x 3.  PSYCH: Mood & affect are appropriate.  HEENT: Head is normocephalic and atraumatic.  Ortho/SPM Exam    Physical Exam    Left Knee: Knee edema (Left).  Right Knee: KNEE EDEMA (RIGHT).  IMAGING:  - MRI of the knee: Date unspecified, results not provided.         Physical Exam:   Body mass index is 30.68 kg/m².  There were no vitals filed for this visit.   GENERAL: Well appearing, appropriate for stated age, no acute distress.  CARDIOVASCULAR: Pulses regular by peripheral palpation.  PULMONARY: Respirations are even and non-labored.  NEURO: Awake, alert, and oriented x 3.  PSYCH: Mood & affect are appropriate.  HEENT: Head is normocephalic and atraumatic.  Ortho/SPM Exam  Left Knee Exam      Inspection   Effusion: present     Tenderness   The patient tender to palpation of the medial joint line.     Range of Motion   Extension:  0   Flexion:  120      Tests   Meniscus   Matthew:  Medial - positive Lateral - negative  Stability   Lachman: normal (-1 to 2mm)   PCL-Posterior Drawer: normal (0 to 2mm)  MCL - Valgus: normal (0 to 2mm)  LCL - Varus: normal (0 to 2mm)     Other   Sensation: normal     Comments:  Intact EHL, FHL, gastrocsoleus, and tibialis anterior. Sensation intact to light touch in superficial peroneal, deep peroneal, tibial, sural, and saphenous nerve distributions. Foot warm and well perfused with capillary  refill of less than 2 seconds and palpable pedal pulses.        Muscle Strength   Left Lower Extremity   Hip Abduction: 5/5   Quadriceps:  5/5   Hamstrin/5      Vascular Exam         Left Pulses  Dorsalis Pedis:      2+  Posterior Tibial:      2+    Imaging:    MRI Knee Without Contrast Left  Narrative: EXAMINATION:  MRI KNEE WITHOUT CONTRAST LEFT    CLINICAL HISTORY:  Meniscal tear, untreated, new symptoms;Pain in left knee    TECHNIQUE:  Multiplanar, multisequence images were preformed about the knee.    COMPARISON:  Plain films from 2025    FINDINGS:  Menisci:  No signal abnormality to suggest meniscal tear.    Cruciate ligaments: No tear is seen. There is no evidence of mucoid degeneration.    Collateral ligaments: The medial and lateral collateral ligaments are intact.    Extensor mechanism: No tear is seen.    Cartilage: There is a small focal full-thickness area of cartilage within the trochlear groove just lateral to midline and best seen on the sagittal imaging plane series 7, image 13 that measures may be 3 x 3 mm.  There is also some heterogeneity/increased signal seen within the trochlear groove cartilage.    Marrow: No signal abnormality is present to suggest a marrow replacement process.No fracture.    Joint fluid and synovium: No significant joint effusion.  Note made of a ruptured popliteal cyst.  Impression: 1. No evidence to suggest a meniscal tear.  2. Chondromalacia involving the trochlear groove.  3. Findings consistent with a ruptured popliteal cyst.    Electronically signed by: Cesar Meredith DO  Date:    2025  Time:    08:58      Relevant imaging results reviewed and interpreted by me, discussed with the patient and / or family today.     Other Tests:     Assessment & Plan    PLAN SUMMARY:   Low dose steroid injection for knee to reduce swelling and inflammation   Avoid running and repetitive bending for a few days post-injection   Gradually resume normal activities after  initial rest period   Dandy agreed to the procedure    OSTEOARTHRITIS OF KNEE:   Recommend low dose steroid injection for the knee to reduce swelling and inflammation.   Dandy agreed to the procedure.   Avoid running and repetitive bending for a few days after the injection, then slowly resume normal activities.         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 a ruptured popliteal cyst.       Encounter Diagnoses   Name Primary?    Ruptured cyst of left popliteal space Yes    Chondromalacia     Class 1 obesity with serious comorbidity and body mass index (BMI) of 30.0 to 30.9 in adult, unspecified obesity type             Plan:  Continue  PT at [place] with Jack - 2-3x per week for 6-8 weeks  Continue to wear knee sleeve during ambulation to help with continued effusion   Discussed possible diagnostic arthroscopy if knee pain continues through conservative treatment.  Left knee CSI 2/2/40 for symptomatic relief.  Gave appropriate warnings.  Monitor blood pressure.  Blood pressure can be affected as well as blood sugar.     Follow-up: as needed. Please reach out to us sooner if there are any problems between now and then.    About Dr. Sophie Barrios's Research & Publications    Give us Feedback:   Google: Leave Google Review  Healthgrades: Leave Healthgrades Review       Provider Note/Medical Decision Making:     I discussed worrisome and red flag signs and symptoms with the patient. The patient expressed understanding and agreed to alert me immediately or to go to the emergency room if they experience any of these. Treatment plan was developed with input from the patient/family, and they expressed understanding and agreement with the plan. All questions were answered  today.          Salvatore Barrios MD  Board Certified in Orthopaedic Surgery & Sports Medicine   Regional Section Head of Orthopedic Surgery & Sports Medicine  [facility] [place]      Disclaimer: This note was prepared using a voice recognition system and is likely to have sound alike errors within the text.     This note was generated with the assistance of ambient listening technology. Verbal consent was obtained by the patient and accompanying visitor(s) for the recording of patient appointment to facilitate this note. I attest to having reviewed and edited the generated note for accuracy, though some syntax or spelling errors may persist. Please contact the author of this note for any clarification.           [1]   Social History  Socioeconomic History    Marital status:    Tobacco Use    Smoking status: Never    Smokeless tobacco: Never   Substance and Sexual Activity    Alcohol use: Yes     Alcohol/week: 3.0 standard drinks of alcohol     Types: 3 Glasses of wine per week    Drug use: Not Currently    Sexual activity: Yes

## 2025-07-28 NOTE — PROCEDURES
Large Joint Aspiration/Injection: L knee    Date/Time: 7/28/2025 9:15 AM    Performed by: Salvatore Barrios MD  Authorized by: Salvatore Barrios MD    Site marked: the procedure site was marked    Timeout: prior to procedure the correct patient, procedure, and site was verified    Prep: patient was prepped and draped in usual sterile fashion      Local anesthesia used?: Yes    Anesthesia:  Local infiltration  Local anesthetic:  Bupivacaine 0.5% without epinephrine, lidocaine 1% without epinephrine and topical anesthetic    Details:  Needle Size:  22 G  Ultrasonic Guidance for needle placement?: No    Approach:  Superior  Location:  Knee  Site:  L knee  Medications:  80 mg methylPREDNISolone acetate 80 mg/mL  Patient tolerance:  Patient tolerated the procedure well with no immediate complications     2cc 1% lidocaine plain, 2cc 0.5% marcaine plain, 0.5cc 80mg methylprednisolone    Procedure Note:  We discussed the risk and benefits of injections, including pain, infection, bleeding, damage to adjacent structures, risk of reaction to injection. We discussed the steroid/cortisone injections will not heal the problem but mat help decrease inflammation and help with symptoms. We discussed the risk of repeated injections. The patient expressed understanding and wanted to proceed with the injection. We performed a timeout to verify the proper patient, proper procedure, and the proper site. The injection site was prepared in a sterile fashion. The patient tolerated it well and there were no complication. We did discuss with the patient that steroid injections can cause some increase in blood sugar and blood pressure for up to a week after the injection.

## 2025-07-28 NOTE — PATIENT INSTRUCTIONS
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 a ruptured popliteal cyst.       Encounter Diagnoses   Name Primary?    Ruptured cyst of left popliteal space Yes    Chondromalacia     Class 1 obesity with serious comorbidity and body mass index (BMI) of 30.0 to 30.9 in adult, unspecified obesity type             Plan:  Continue  PT at [place] with Jack - 2-3x per week for 6-8 weeks  Continue to wear knee sleeve during ambulation to help with continued effusion   Discussed possible diagnostic arthroscopy if knee pain continues through conservative treatment.  Left knee CSI 2/2/40 for symptomatic relief.  Gave appropriate warnings.  Monitor blood pressure.  Blood pressure can be affected as well as blood sugar.     Follow-up: as needed. Please reach out to us sooner if there are any problems between now and then.    About Dr. Sophie Barrios's Research & Publications    Give us Feedback:   Google: Leave Google Review  Healthgrades: Leave Healthgrades Review

## 2025-07-30 RX ORDER — METHYLPREDNISOLONE ACETATE 80 MG/ML
80 INJECTION, SUSPENSION INTRA-ARTICULAR; INTRALESIONAL; INTRAMUSCULAR; SOFT TISSUE
Status: DISCONTINUED | OUTPATIENT
Start: 2025-07-28 | End: 2025-07-30 | Stop reason: HOSPADM

## (undated) NOTE — MR AVS SNAPSHOT
[place] [address] [place], [address]  [phone]               Thank you for choosing us for your health care visit with Tamanna Osborne MD.  We are glad to serve you and happy to provide you with Rash of neck [R21], Nasal obstruction [J34.89], Migraine with aura and without status migrainosus, not intractable [G43.109]           Lipid Panel [E]    Complete by:  Feb 10, 2017 (Approximate)    Assoc Dx:  Turf toe, initial encounter Neema cox Don’t eat while distracted and slow down. Avoid over sized portions. Don’t eat while when you’re bored.      EAT THESE FOODS MORE OFTEN: EAT THESE FOODS LESS OFTEN:   Make half your plate fruits and vegetables Highly refined, white starches including wh